# Patient Record
Sex: FEMALE | Race: WHITE | Employment: FULL TIME | URBAN - METROPOLITAN AREA
[De-identification: names, ages, dates, MRNs, and addresses within clinical notes are randomized per-mention and may not be internally consistent; named-entity substitution may affect disease eponyms.]

---

## 2024-10-31 ENCOUNTER — ROUTINE PRENATAL (OUTPATIENT)
Age: 24
End: 2024-10-31

## 2024-10-31 VITALS — HEART RATE: 80 BPM | DIASTOLIC BLOOD PRESSURE: 76 MMHG | SYSTOLIC BLOOD PRESSURE: 114 MMHG

## 2024-10-31 DIAGNOSIS — Z3A.17 17 WEEKS GESTATION OF PREGNANCY: Primary | ICD-10-CM

## 2024-11-08 NOTE — PROCEDURES
PATIENT: JESUS MAHER   -  : 2000   -  DOS:10/31/2024   -  INTERPRETING PROVIDER:Kaur Monge,   Indication  ========    Maternal von Willebrand's disease  Family history of spina bifida    Method  ======    Transabdominal ultrasound examination. View: Sufficient    Dating  ======    LMP on: 2024  Cycle: regular cycle  GA by LMP 17 w + 4 d  ABENA by LMP: 2025  Ultrasound examination on: 10/31/2024  GA by U/S based upon: AC, BPD, Femur, HC  GA by U/S 17 w + 1 d  ABENA by U/S: 2025  Assigned: based on the LMP, selected on 10/31/2024  Assigned GA 17 w + 4 d  Assigned ABENA: 2025    Fetal Growth Overview  =================    Exam date        GA              BPD (mm)          HC (mm)              AC (mm)              FL (mm)             HL (mm)             EFW (g)  10/31/2024        17w 4d        35.7     23%        137.8    22%        114.7     37%        23.2    23%        24.7     64%        182    20%    Fetal Biometry  ============    Standard  BPD 35.7 mm 17w 0d 23% Hadlock  OFD 49.5 mm 18w 2d 77% Yessi  .8 mm 17w 1d 22% Hadlock  Cerebellum tr 17.3 mm 17w 3d 39% Hill  .7 mm 17w 2d 37% Hadlock  Femur 23.2 mm 17w 0d 23% Hadlock  Humerus 24.7 mm 17w 5d 64% Yessi   g 17w 0d 20% Hadlock  EFW (lb) 0 lb  EFW (oz) 6 oz  EFW by: Hadlock (BPD-HC-AC-FL)  Head / Face / Neck  Nasal bone: NOT VISUALIZED  Other Structures   bpm    General Evaluation  ==============    Cardiac activity present.  bpm. Fetal movements: visualized. Presentation: Cephalic  Placenta: Placental site: anterior, appropriate distance from the internal os. Placental edge-to-cervical os distance 4.8 cm  Umbilical cord: Cord vessels: SUBOPTIMAL, 3 vessel cord. Insertion site: central  Amniotic fluid: Amount of AF: normal. MVP 2.6 cm    Fetal Anatomy  ===========    Cranium: normal  Lateral ventricles: SUBOPTIMAL  Choroid plexus: normal  Midline falx: normal  Cavum septi pellucidi: NOT

## 2024-11-20 ENCOUNTER — ROUTINE PRENATAL (OUTPATIENT)
Age: 24
End: 2024-11-20

## 2024-11-20 ENCOUNTER — ROUTINE PRENATAL (OUTPATIENT)
Age: 24
End: 2024-11-20
Payer: COMMERCIAL

## 2024-11-20 VITALS
DIASTOLIC BLOOD PRESSURE: 73 MMHG | WEIGHT: 204.8 LBS | BODY MASS INDEX: 27.74 KG/M2 | HEART RATE: 90 BPM | HEIGHT: 72 IN | SYSTOLIC BLOOD PRESSURE: 115 MMHG

## 2024-11-20 VITALS — DIASTOLIC BLOOD PRESSURE: 73 MMHG | HEART RATE: 81 BPM | SYSTOLIC BLOOD PRESSURE: 106 MMHG

## 2024-11-20 DIAGNOSIS — R45.86 MOOD CHANGE: ICD-10-CM

## 2024-11-20 DIAGNOSIS — Z3A.20 20 WEEKS GESTATION OF PREGNANCY: ICD-10-CM

## 2024-11-20 DIAGNOSIS — Z3A.20 20 WEEKS GESTATION OF PREGNANCY: Primary | ICD-10-CM

## 2024-11-20 DIAGNOSIS — Z34.80 PRENATAL CARE OF MULTIGRAVIDA, ANTEPARTUM: Primary | ICD-10-CM

## 2024-11-20 PROCEDURE — 0502F SUBSEQUENT PRENATAL CARE: CPT | Performed by: OBSTETRICS & GYNECOLOGY

## 2024-11-20 PROCEDURE — 99214 OFFICE O/P EST MOD 30 MIN: CPT | Performed by: STUDENT IN AN ORGANIZED HEALTH CARE EDUCATION/TRAINING PROGRAM

## 2024-11-20 PROCEDURE — 76816 OB US FOLLOW-UP PER FETUS: CPT | Performed by: STUDENT IN AN ORGANIZED HEALTH CARE EDUCATION/TRAINING PROGRAM

## 2024-11-20 RX ORDER — ESCITALOPRAM OXALATE 10 MG/1
10 TABLET ORAL DAILY
Qty: 30 TABLET | Refills: 4 | Status: CANCELLED | OUTPATIENT
Start: 2024-11-20

## 2024-11-20 NOTE — RESULT ENCOUNTER NOTE
Pt s/p MFM US and consultation.  MFM report reviewed by Elvira Wetzel MD.   Plan MFM follow up recommendations.  Confirm growth US at [facility], no MFM fu needed at this point

## 2024-11-20 NOTE — PROGRESS NOTES
Patient was seen 11/20/2024      Please look under media to view full consult and ultrasound report in [employer].         Elizabeth Kay MD   Maternal Fetal Medicine

## 2024-11-20 NOTE — PROGRESS NOTES
[facility]  [phone]  Elvira Wetzel MD, FACOG    OB/GYN: OB     Chief Complaint:   Chief Complaint   Patient presents with    Routine Prenatal Visit       Patient Active Problem List    Diagnosis Date Noted    Prenatal care of multigravida, antepartum 09/06/2024    Pregnancy with poor obstetric history 05/01/2023    Fusion of spine 12/20/2017    Thoracic compression fracture (HCC) 08/25/2016    Von Willebrand disease (HCC) 05/09/2013    Emesis 05/09/2013    Poor school compliance 05/09/2013    Abdominal pain 05/09/2013    Patellar subluxation 09/13/2012       Doing well, co HA, daily feels related to sinus pressure.  HO seasonal allergies  Has hematology fu  Had MFM fu  Wants anesthesia consult for improved pain control w delivery this time.        PFSH:  Past Medical History:   Diagnosis Date    Abdominal migraine     RESOLVED SINCE STARTED PROBIOTIC IN 2015    Abdominal pain 05/09/2013    Acne     Anemia     Autoimmune disease (HCC)     DETAILS NOT KNOWN BY PT OR MOTHER ON 8/8/16    Childhood obesity     Chronic pain     KNEE BILAT    Coagulation defects     VON WILLERBRAND (MILD FORM)    Disease of blood and blood forming organ     Gastrointestinal disorder     RESOLVED SINCE STARTED PROBIOTICS IN 2015    GERD (gastroesophageal reflux disease)     Hepatic artery stenosis (HCC)     MOM AND PT NOT AWARE OF THIS 12/12/18    IBS (irritable bowel syndrome)     L1 vertebral fracture (HCC)     s/p surgery    Menorrhagia     Migraine     Nausea & vomiting     \"WITH EVERY SURGERY\"; \"THE PATCH HAS HELPED\"    Other ill-defined conditions(799.89)     CHEMICALLY INDUCED MENSES 4X YEAR DUE TO VON WILLEBRANDE    Pap smear for cervical cancer screening 06/20/2022    neg    Pap smear for cervical cancer screening 01/27/2020    negative    Von Willebrand disease (HCC); TYPE 2 (now by hematology testing)     \"mild case\" per mom, fh PPH (sister 3rd, mother 1st), no ho ddavp, no ho bleeding with

## 2024-11-20 NOTE — PROCEDURES
PATIENT: JESUS MAHER   -  : 2000   -  DOS:2024   -  INTERPRETING PROVIDER:Elizabeth Kay,   Indication  ========    Maternal von Willebrand's disease  Family history of spina bifida    Method  ======    Transabdominal ultrasound examination. View: Good view    Pregnancy  =========    Garza pregnancy. Number of fetuses: 1    Dating  ======    LMP on: 2024  Cycle: regular cycle  GA by LMP 20 w + 3 d  ABENA by LMP: 2025  Ultrasound examination on: 2024  GA by U/S based upon: AC, BPD, Femur, HC  GA by U/S 19 w + 5 d  ABENA by U/S: 2025  Assigned: based on the LMP, selected on 10/31/2024  Assigned GA 20 w + 3 d  Assigned ABENA: 2025    Fetal Biometry  ============    Standard  BPD 43.6 mm 19w 1d 8% Hadlock  OFD 59.7 mm 20w 5d 59% Yessi  .2 mm 19w 2d 4% Hadlock  Cerebellum tr 20.5 mm 19w 4d 41% Hill  .7 mm 20w 3d 42% Hadlock  Femur 32.2 mm 20w 0d 28% Hadlock   g 20w 0d 27% Hadlock  EFW (lb) 0 lb  EFW (oz) 12 oz  EFW by: Hadlock (BPD-HC-AC-FL)  Extended   6.9 mm  CM 3.5 mm  8% Nicolaides  Nasal bone 6.8 mm  Head / Face / Neck  Nasal bone: present  Other Structures   bpm    General Evaluation  ==============    Cardiac activity present.  bpm. Fetal movements: visualized. Presentation: Transverse, head to maternal left  Placenta: Placental site: anterior, appropriate distance from the internal os  Umbilical cord: Cord vessels: 3 vessel cord. Insertion site: central  Amniotic fluid: Amount of AF: normal. MVP 5.0 cm    Fetal Anatomy  ===========    Lateral ventricles: normal  Cavum septi pellucidi: normal  Cisterna magna: normal  Head / Neck  Neck: normal  Lips: normal  Profile: normal  Nose: normal  Face  Coronal face: normal  Nasal bone: present  Palate: normal  Maxilla: normal  Mandible: normal  Orbits: normal  4-chamber view: normal  RVOT view: normal  LVOT view: normal  3-vessel view: normal  3-vessel-trachea view: normal  Heart / Thorax  Bicaval

## 2024-12-16 ENCOUNTER — ROUTINE PRENATAL (OUTPATIENT)
Age: 24
End: 2024-12-16

## 2024-12-16 VITALS
HEART RATE: 94 BPM | OXYGEN SATURATION: 98 % | SYSTOLIC BLOOD PRESSURE: 101 MMHG | HEIGHT: 72 IN | WEIGHT: 215.4 LBS | TEMPERATURE: 97.3 F | RESPIRATION RATE: 18 BRPM | BODY MASS INDEX: 29.17 KG/M2 | DIASTOLIC BLOOD PRESSURE: 67 MMHG

## 2024-12-16 DIAGNOSIS — Z3A.24 24 WEEKS GESTATION OF PREGNANCY: Primary | ICD-10-CM

## 2024-12-16 DIAGNOSIS — Z34.80 PRENATAL CARE OF MULTIGRAVIDA, ANTEPARTUM: ICD-10-CM

## 2024-12-16 PROCEDURE — 0502F SUBSEQUENT PRENATAL CARE: CPT | Performed by: OBSTETRICS & GYNECOLOGY

## 2024-12-16 NOTE — PROGRESS NOTES
[facility]  [phone]  Elvira Wetzel MD, FACOG  [email or web address]      Routine follow-up OB visit     Chief Complaint   Patient presents with    Routine Prenatal Visit       Patient Active Problem List    Diagnosis Date Noted    Prenatal care of multigravida, antepartum 2024    Pregnancy with poor obstetric history 2023    Fusion of spine 2017    Thoracic compression fracture (HCC) 2016    Von Willebrand disease (HCC) 2013    Emesis 2013    Poor school compliance 2013    Abdominal pain 2013    Patellar subluxation 2012       SUBJECTIVE:  Pt reports doing well.      OBJECTIVE:  Vitals:    24 0935   BP: 101/67   Pulse: 94   Resp: 18   Temp: 97.3 °F (36.3 °C)   SpO2: 98%     See prenatal flowsheet    Physical Exam:  Gen: no acute distress, normal speech  Cardiac: appears warm and well perfused  Pulm: breathing comfortably on room air  Abd: soft, gravid, non-tender  Fundal height: see prenatal flowsheet  Ext: symmetric; moves all 4 extremities equally    Assessment:  24 y.o.  24w1d   Routine prenatal visit  Encounter Diagnoses   Name Primary?    24 weeks gestation of pregnancy Yes    Prenatal care of multigravida, antepartum        Plan:   Routine follow up OB appointment  Continue routine prenatal care   Routine OB instructions given appropriate to gestational age, see AVS    No orders of the defined types were placed in this encounter.       No follow-ups on file.      On this date of service, I have spent greater than 10 minutes reviewing the patient's previous notes, test results and planned follow up as well as performing documentation related to this visit.  More than 50% of this time was spent face to face with the patient discussing the plan of care, diagnosis and importance of compliance with the prenatal care and physician recommendations as well as answering any

## 2025-01-06 DIAGNOSIS — D68.00 VON WILLEBRAND DISEASE (HCC): ICD-10-CM

## 2025-01-06 LAB
BASOPHILS # BLD: 0.1 K/UL (ref 0–0.1)
BASOPHILS NFR BLD: 1 % (ref 0–1)
DIFFERENTIAL METHOD BLD: ABNORMAL
EOSINOPHIL # BLD: 0.3 K/UL (ref 0–0.4)
EOSINOPHIL NFR BLD: 2 % (ref 0–7)
ERYTHROCYTE [DISTWIDTH] IN BLOOD BY AUTOMATED COUNT: 12.8 % (ref 11.5–14.5)
FERRITIN SERPL-MCNC: 4 NG/ML (ref 26–388)
HCT VFR BLD AUTO: 32.4 % (ref 35–47)
HGB BLD-MCNC: 10.6 G/DL (ref 11.5–16)
IMM GRANULOCYTES # BLD AUTO: 0.2 K/UL (ref 0–0.04)
IMM GRANULOCYTES NFR BLD AUTO: 1 % (ref 0–0.5)
IRON SATN MFR SERPL: 6 % (ref 20–50)
IRON SERPL-MCNC: 35 UG/DL (ref 35–150)
LYMPHOCYTES # BLD: 2.8 K/UL (ref 0.8–3.5)
LYMPHOCYTES NFR BLD: 25 % (ref 12–49)
MCH RBC QN AUTO: 29.5 PG (ref 26–34)
MCHC RBC AUTO-ENTMCNC: 32.7 G/DL (ref 30–36.5)
MCV RBC AUTO: 90.3 FL (ref 80–99)
MONOCYTES # BLD: 0.6 K/UL (ref 0–1)
MONOCYTES NFR BLD: 5 % (ref 5–13)
NEUTS SEG # BLD: 7.2 K/UL (ref 1.8–8)
NEUTS SEG NFR BLD: 66 % (ref 32–75)
NRBC # BLD: 0 K/UL (ref 0–0.01)
NRBC BLD-RTO: 0 PER 100 WBC
PLATELET # BLD AUTO: 282 K/UL (ref 150–400)
PMV BLD AUTO: 11.1 FL (ref 8.9–12.9)
RBC # BLD AUTO: 3.59 M/UL (ref 3.8–5.2)
TIBC SERPL-MCNC: 569 UG/DL (ref 250–450)
WBC # BLD AUTO: 11.2 K/UL (ref 3.6–11)

## 2025-01-07 LAB
FACT VIII ACT/NOR PPP: 92 % (ref 56–140)
INTERPRETATION: ABNORMAL
VWF AG ACT/NOR PPP IA: 64 % (ref 50–200)
VWF:RCO ACT/NOR PPP PL AGG: 34 % (ref 50–200)

## 2025-01-10 ENCOUNTER — OFFICE VISIT (OUTPATIENT)
Age: 25
End: 2025-01-10
Payer: COMMERCIAL

## 2025-01-10 VITALS
HEIGHT: 72 IN | TEMPERATURE: 97.2 F | HEART RATE: 85 BPM | WEIGHT: 224 LBS | OXYGEN SATURATION: 100 % | DIASTOLIC BLOOD PRESSURE: 80 MMHG | BODY MASS INDEX: 30.34 KG/M2 | SYSTOLIC BLOOD PRESSURE: 138 MMHG

## 2025-01-10 DIAGNOSIS — Z3A.27 27 WEEKS GESTATION OF PREGNANCY: ICD-10-CM

## 2025-01-10 DIAGNOSIS — D68.00 VON WILLEBRAND DISEASE (HCC): Primary | ICD-10-CM

## 2025-01-10 PROCEDURE — 99214 OFFICE O/P EST MOD 30 MIN: CPT | Performed by: NURSE PRACTITIONER

## 2025-01-10 NOTE — PROGRESS NOTES
Tari Irwin is a 24 y.o. female here for follow up of vWD. Patient with no complaints of pain at this time.   
patients with type O. The lower   limit of the reference interval in persons with type O is   approximately 40%. VWF and FVIII may elevate as compared to   baseline in pregnancy, in samples drawn from patients   (particularly children) who are visibly stressed at the time   of phlebotomy, as acute phase reactants, or in response to   certain drug therapies such as desmopressin and estrogen.   VON WILLEBRAND FACTOR ASSESSMENT FURTHER CONSIDERATIONS   -   Consider repeat analysis on a new plasma sample to   re-evaluate this pattern of results. If abnormal results are   confirmed on a new plasma sample in a patient with   appropriate clinical symptoms and family history, further   evaluation, possibly to include VWF multimer analysis, is   needed to determine VWD type. In patients who are type 1, 2A   or 2M VWD, a desmopressin challenge should be performed if   desmopressin is under consideration as a therapeutic option.   Following administration of desmopressin, VWF:RCo and FVIII   activity levels should be drawn at 1 and 4 hours to   determine if response is both appropriate and sustained.   Desmopressin therapy is controversial in type 2B and   platelet type VWD as it may induce thrombocytopenia.   Evaluation of the VWF:RCo/VWF:Ag ratio following a   desmopressin challenge may assist in determining VWD type.   In both type 2A and 2B VWD the VWF:RCo/VWF:Ag ratio post   desmopressin tends to be less than 0.7.   VON WILLEBRAND FACTOR ASSESSMENT DEFINITIONS     11/20/23: FVIII activity 200 [], VWF Ag 180 [], VWF Activity 61 []    04/29/24: FVIII activity 26%, VWF Ag 23%, VWF Activity 13%  10/21/24: FVIII activity 55%, VWF Ag 46%, VWF Activity 37%  01/06/25: FVIII activity 92%, VWF Ag 64%, VWF Activity 34%         Imaging:     Radiology report(s) reviewed.      Assessment & Plan:   Tari Irwin is a 24 y.o. female     1. Von Willebrand disease, type 2A:  This patient reports a history of mild type I